# Patient Record
Sex: MALE | Race: BLACK OR AFRICAN AMERICAN | NOT HISPANIC OR LATINO | URBAN - METROPOLITAN AREA
[De-identification: names, ages, dates, MRNs, and addresses within clinical notes are randomized per-mention and may not be internally consistent; named-entity substitution may affect disease eponyms.]

---

## 2018-09-01 ENCOUNTER — HOSPITAL ENCOUNTER (EMERGENCY)
Facility: HOSPITAL | Age: 23
Discharge: HOME | End: 2018-09-01
Attending: EMERGENCY MEDICINE
Payer: COMMERCIAL

## 2018-09-01 VITALS
HEART RATE: 67 BPM | HEIGHT: 78 IN | SYSTOLIC BLOOD PRESSURE: 116 MMHG | OXYGEN SATURATION: 99 % | WEIGHT: 260 LBS | RESPIRATION RATE: 16 BRPM | BODY MASS INDEX: 30.08 KG/M2 | TEMPERATURE: 99 F | DIASTOLIC BLOOD PRESSURE: 62 MMHG

## 2018-09-01 DIAGNOSIS — R07.89 CHEST WALL PAIN: ICD-10-CM
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
## 2018-09-01 DIAGNOSIS — M54.9 ACUTE BACK PAIN, UNSPECIFIED BACK LOCATION, UNSPECIFIED BACK PAIN LATERALITY: Primary | ICD-10-CM

## 2018-09-01 DIAGNOSIS — M79.602 LEFT ARM PAIN: ICD-10-CM

## 2018-09-01 LAB
BILIRUBIN, POC UA: NEGATIVE
BLOOD, POC UA: NEGATIVE
CLARITY, POC UA: CLEAR
COLOR, POC UA: YELLOW
GLUCOSE, POC UA: NEGATIVE
KETONES, POC UA: NEGATIVE
LEUKOCYTE EST, POC UA: NEGATIVE
NITRITE, POC UA: NEGATIVE
PH UR STRIP: 7 [PH]
PROTEIN, POC UA: NEGATIVE
SPECIFIC GRAVITY, POC UA: 1.02
UROBILINOGEN, POC UA: 1 E.U./DL

## 2018-09-01 PROCEDURE — 96372 THER/PROPH/DIAG INJ SC/IM: CPT

## 2018-09-01 PROCEDURE — 25000003 PHARM REV CODE 250: Performed by: EMERGENCY MEDICINE

## 2018-09-01 PROCEDURE — 63600175 PHARM REV CODE 636 W HCPCS: Performed by: EMERGENCY MEDICINE

## 2018-09-01 PROCEDURE — 81003 URINALYSIS AUTO W/O SCOPE: CPT

## 2018-09-01 PROCEDURE — 99283 EMERGENCY DEPT VISIT LOW MDM: CPT | Mod: 25

## 2018-09-01 RX ORDER — METHOCARBAMOL 750 MG/1
1500 TABLET, FILM COATED ORAL EVERY 6 HOURS
Qty: 24 TABLET | Refills: 0 | Status: SHIPPED | OUTPATIENT
Start: 2018-09-01 | End: 2018-09-04

## 2018-09-01 RX ORDER — KETOROLAC TROMETHAMINE 10 MG/1
10 TABLET, FILM COATED ORAL
Status: COMPLETED | OUTPATIENT
Start: 2018-09-01 | End: 2018-09-01

## 2018-09-01 RX ORDER — ORPHENADRINE CITRATE 30 MG/ML
60 INJECTION INTRAMUSCULAR; INTRAVENOUS
Status: COMPLETED | OUTPATIENT
Start: 2018-09-01 | End: 2018-09-01

## 2018-09-01 RX ORDER — IBUPROFEN 800 MG/1
800 TABLET ORAL EVERY 6 HOURS PRN
Qty: 20 TABLET | Refills: 0 | Status: SHIPPED | OUTPATIENT
Start: 2018-09-01

## 2018-09-01 RX ADMIN — KETOROLAC TROMETHAMINE 10 MG: 10 TABLET, FILM COATED ORAL at 10:09

## 2018-09-01 RX ADMIN — ORPHENADRINE CITRATE 60 MG: 30 INJECTION INTRAMUSCULAR; INTRAVENOUS at 09:09
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
